# Patient Record
Sex: FEMALE | Race: WHITE | URBAN - METROPOLITAN AREA
[De-identification: names, ages, dates, MRNs, and addresses within clinical notes are randomized per-mention and may not be internally consistent; named-entity substitution may affect disease eponyms.]

---

## 2019-03-21 ENCOUNTER — APPOINTMENT (RX ONLY)
Dept: URBAN - METROPOLITAN AREA CLINIC 51 | Facility: CLINIC | Age: 84
Setting detail: DERMATOLOGY
End: 2019-03-21

## 2019-03-21 DIAGNOSIS — L81.4 OTHER MELANIN HYPERPIGMENTATION: ICD-10-CM

## 2019-03-21 DIAGNOSIS — L82.1 OTHER SEBORRHEIC KERATOSIS: ICD-10-CM

## 2019-03-21 DIAGNOSIS — L30.1 DYSHIDROSIS [POMPHOLYX]: ICD-10-CM

## 2019-03-21 DIAGNOSIS — D18.0 HEMANGIOMA: ICD-10-CM

## 2019-03-21 PROBLEM — L20.84 INTRINSIC (ALLERGIC) ECZEMA: Status: ACTIVE | Noted: 2019-03-21

## 2019-03-21 PROBLEM — D18.01 HEMANGIOMA OF SKIN AND SUBCUTANEOUS TISSUE: Status: ACTIVE | Noted: 2019-03-21

## 2019-03-21 PROCEDURE — 99202 OFFICE O/P NEW SF 15 MIN: CPT

## 2019-03-21 PROCEDURE — ? COUNSELING

## 2019-03-21 PROCEDURE — ? PRESCRIPTION

## 2019-03-21 RX ORDER — CLOBETASOL PROPIONATE 0.5 MG/G
CREAM TOPICAL BID
Qty: 1 | Refills: 3 | COMMUNITY
Start: 2019-03-21

## 2019-03-21 RX ADMIN — CLOBETASOL PROPIONATE: 0.5 CREAM TOPICAL at 20:38

## 2019-03-21 ASSESSMENT — LOCATION ZONE DERM: LOCATION ZONE: FACE

## 2019-03-21 ASSESSMENT — LOCATION SIMPLE DESCRIPTION DERM
LOCATION SIMPLE: RIGHT CHEEK
LOCATION SIMPLE: LEFT CHEEK

## 2019-03-21 ASSESSMENT — LOCATION DETAILED DESCRIPTION DERM
LOCATION DETAILED: LEFT INFERIOR CENTRAL MALAR CHEEK
LOCATION DETAILED: RIGHT INFERIOR CENTRAL MALAR CHEEK

## 2020-05-15 ENCOUNTER — APPOINTMENT (RX ONLY)
Dept: URBAN - METROPOLITAN AREA CLINIC 51 | Facility: CLINIC | Age: 85
Setting detail: DERMATOLOGY
End: 2020-05-15

## 2020-05-15 DIAGNOSIS — L85.1 ACQUIRED KERATOSIS [KERATODERMA] PALMARIS ET PLANTARIS: ICD-10-CM

## 2020-05-15 DIAGNOSIS — B35.1 TINEA UNGUIUM: ICD-10-CM

## 2020-05-15 DIAGNOSIS — L30.8 OTHER SPECIFIED DERMATITIS: ICD-10-CM

## 2020-05-15 PROCEDURE — ? PRESCRIPTION

## 2020-05-15 PROCEDURE — 99213 OFFICE O/P EST LOW 20 MIN: CPT

## 2020-05-15 PROCEDURE — ? COUNSELING

## 2020-05-15 RX ORDER — CLOBETASOL PROPIONATE 0.5 MG/G
CREAM TOPICAL BID
Qty: 1 | Refills: 2

## 2020-05-15 ASSESSMENT — LOCATION DETAILED DESCRIPTION DERM
LOCATION DETAILED: LEFT MEDIAL PLANTAR MIDFOOT
LOCATION DETAILED: RIGHT MEDIAL PLANTAR MIDFOOT

## 2020-05-15 ASSESSMENT — LOCATION SIMPLE DESCRIPTION DERM
LOCATION SIMPLE: LEFT PLANTAR SURFACE
LOCATION SIMPLE: RIGHT PLANTAR SURFACE

## 2020-05-15 ASSESSMENT — LOCATION ZONE DERM: LOCATION ZONE: FEET

## 2020-05-15 NOTE — HPI: DRY SKIN
How Severe Is Your Dry Skin?: moderate
Additional History: \\n\\n\\n\\n\\nHas had this once before about 30 years  ago, on the feet as well. Patient says feet are burning currently and is worried it will start on the feet soon.

## 2020-05-29 ENCOUNTER — APPOINTMENT (RX ONLY)
Dept: URBAN - METROPOLITAN AREA CLINIC 51 | Facility: CLINIC | Age: 85
Setting detail: DERMATOLOGY
End: 2020-05-29

## 2020-05-29 ENCOUNTER — RX ONLY (OUTPATIENT)
Age: 85
Setting detail: RX ONLY
End: 2020-05-29

## 2020-05-29 DIAGNOSIS — L30.8 OTHER SPECIFIED DERMATITIS: ICD-10-CM

## 2020-05-29 DIAGNOSIS — L85.1 ACQUIRED KERATOSIS [KERATODERMA] PALMARIS ET PLANTARIS: ICD-10-CM

## 2020-05-29 PROCEDURE — 99213 OFFICE O/P EST LOW 20 MIN: CPT

## 2020-05-29 PROCEDURE — ? PRESCRIPTION

## 2020-05-29 PROCEDURE — ? ADDITIONAL NOTES

## 2020-05-29 PROCEDURE — ? COUNSELING

## 2020-05-29 RX ORDER — AMMONIUM LACTATE 120 MG/G
CREAM TOPICAL
Qty: 1 | Refills: 2 | COMMUNITY
Start: 2020-05-29

## 2020-05-29 RX ORDER — AMMONIUM LACTATE 120 MG/G
CREAM TOPICAL
Qty: 1 | Refills: 5

## 2020-05-29 RX ADMIN — AMMONIUM LACTATE: 120 CREAM TOPICAL at 00:00

## 2020-05-29 ASSESSMENT — LOCATION SIMPLE DESCRIPTION DERM
LOCATION SIMPLE: LEFT PLANTAR SURFACE
LOCATION SIMPLE: RIGHT PLANTAR SURFACE

## 2020-05-29 ASSESSMENT — LOCATION ZONE DERM: LOCATION ZONE: FEET

## 2020-05-29 ASSESSMENT — LOCATION DETAILED DESCRIPTION DERM
LOCATION DETAILED: RIGHT MEDIAL PLANTAR MIDFOOT
LOCATION DETAILED: LEFT MEDIAL PLANTAR MIDFOOT

## 2022-04-30 ENCOUNTER — TELEPHONE ENCOUNTER (OUTPATIENT)
Dept: URBAN - METROPOLITAN AREA CLINIC 121 | Facility: CLINIC | Age: 87
End: 2022-04-30

## 2022-05-01 ENCOUNTER — TELEPHONE ENCOUNTER (OUTPATIENT)
Dept: URBAN - METROPOLITAN AREA CLINIC 121 | Facility: CLINIC | Age: 87
End: 2022-05-01

## 2022-05-01 RX ORDER — WARFARIN SODIUM 1 MG/1
TABLET ORAL
Status: ACTIVE | COMMUNITY
Start: 2012-02-23

## 2022-05-01 RX ORDER — SPIRONOLACTONE 25 MG
TABLET ORAL
Status: ACTIVE | COMMUNITY
Start: 2006-09-19

## 2022-05-01 RX ORDER — CALCIUM CARBONATE 500(1250)
TABLET ORAL
Status: ACTIVE | COMMUNITY
Start: 2006-09-19

## 2022-05-01 RX ORDER — ZOLPIDEM TARTRATE 10 MG
TABLET ORAL
Status: ACTIVE | COMMUNITY
Start: 2006-09-19

## 2022-05-01 RX ORDER — ARIPIPRAZOLE 2 MG/1
TABLET ORAL
Status: ACTIVE | COMMUNITY
Start: 2009-10-12

## 2022-05-01 RX ORDER — GLUCOSAMINE SULFATE 500 MG
CAPSULE ORAL
Status: ACTIVE | COMMUNITY
Start: 2006-09-19

## 2022-05-01 RX ORDER — VALSARTAN/HYDROCHLOROTHIAZIDE 80-12.5MG
TABLET ORAL
Status: ACTIVE | COMMUNITY
Start: 2006-09-19

## 2022-08-19 NOTE — PROCEDURE: ADDITIONAL NOTES
Additional Notes: Patient consent was obtained to proceed with the visit and recommended plan of care after discussion of all risks and benefits, including the risks of COVID-19 exposure.
Make sure you are getting plenty of fiber, trial Metamucil.  To help have a bowel movement try MiraLAX as this is not habit-forming.  
Detail Level: Simple
Additional Notes: Continue clobetasol for another 2 weeks on hands.